# Patient Record
Sex: MALE | Race: WHITE | NOT HISPANIC OR LATINO | Employment: FULL TIME | URBAN - METROPOLITAN AREA
[De-identification: names, ages, dates, MRNs, and addresses within clinical notes are randomized per-mention and may not be internally consistent; named-entity substitution may affect disease eponyms.]

---

## 2023-08-21 ENCOUNTER — LAB (OUTPATIENT)
Dept: LAB | Facility: LAB | Age: 33
End: 2023-08-21
Payer: COMMERCIAL

## 2023-08-21 ENCOUNTER — OFFICE VISIT (OUTPATIENT)
Dept: PRIMARY CARE | Facility: CLINIC | Age: 33
End: 2023-08-21
Payer: COMMERCIAL

## 2023-08-21 VITALS
DIASTOLIC BLOOD PRESSURE: 60 MMHG | HEART RATE: 61 BPM | HEIGHT: 69 IN | WEIGHT: 193 LBS | BODY MASS INDEX: 28.58 KG/M2 | SYSTOLIC BLOOD PRESSURE: 102 MMHG

## 2023-08-21 DIAGNOSIS — Z00.00 HEALTHCARE MAINTENANCE: ICD-10-CM

## 2023-08-21 DIAGNOSIS — E78.2 MIXED HYPERLIPIDEMIA: ICD-10-CM

## 2023-08-21 DIAGNOSIS — M79.602 LEFT ARM PAIN: ICD-10-CM

## 2023-08-21 DIAGNOSIS — Z00.00 HEALTHCARE MAINTENANCE: Primary | ICD-10-CM

## 2023-08-21 LAB
ALANINE AMINOTRANSFERASE (SGPT) (U/L) IN SER/PLAS: 58 U/L (ref 10–52)
ALBUMIN (G/DL) IN SER/PLAS: 4.5 G/DL (ref 3.4–5)
ALKALINE PHOSPHATASE (U/L) IN SER/PLAS: 71 U/L (ref 33–120)
ANION GAP IN SER/PLAS: 12 MMOL/L (ref 10–20)
ASPARTATE AMINOTRANSFERASE (SGOT) (U/L) IN SER/PLAS: 32 U/L (ref 9–39)
BILIRUBIN TOTAL (MG/DL) IN SER/PLAS: 1 MG/DL (ref 0–1.2)
CALCIUM (MG/DL) IN SER/PLAS: 9.6 MG/DL (ref 8.6–10.3)
CARBON DIOXIDE, TOTAL (MMOL/L) IN SER/PLAS: 29 MMOL/L (ref 21–32)
CHLORIDE (MMOL/L) IN SER/PLAS: 105 MMOL/L (ref 98–107)
CHOLESTEROL (MG/DL) IN SER/PLAS: 173 MG/DL (ref 0–199)
CHOLESTEROL IN HDL (MG/DL) IN SER/PLAS: 61.3 MG/DL
CHOLESTEROL/HDL RATIO: 2.8
COBALAMIN (VITAMIN B12) (PG/ML) IN SER/PLAS: 469 PG/ML (ref 211–911)
CREATININE (MG/DL) IN SER/PLAS: 0.87 MG/DL (ref 0.5–1.3)
ERYTHROCYTE DISTRIBUTION WIDTH (RATIO) BY AUTOMATED COUNT: 13 % (ref 11.5–14.5)
ERYTHROCYTE MEAN CORPUSCULAR HEMOGLOBIN CONCENTRATION (G/DL) BY AUTOMATED: 33.1 G/DL (ref 32–36)
ERYTHROCYTE MEAN CORPUSCULAR VOLUME (FL) BY AUTOMATED COUNT: 92 FL (ref 80–100)
ERYTHROCYTES (10*6/UL) IN BLOOD BY AUTOMATED COUNT: 4.74 X10E12/L (ref 4.5–5.9)
GFR MALE: >90 ML/MIN/1.73M2
GLUCOSE (MG/DL) IN SER/PLAS: 88 MG/DL (ref 74–99)
HEMATOCRIT (%) IN BLOOD BY AUTOMATED COUNT: 43.5 % (ref 41–52)
HEMOGLOBIN (G/DL) IN BLOOD: 14.4 G/DL (ref 13.5–17.5)
LDL: 95 MG/DL (ref 0–99)
LEUKOCYTES (10*3/UL) IN BLOOD BY AUTOMATED COUNT: 8.4 X10E9/L (ref 4.4–11.3)
PLATELETS (10*3/UL) IN BLOOD AUTOMATED COUNT: 284 X10E9/L (ref 150–450)
POTASSIUM (MMOL/L) IN SER/PLAS: 4.5 MMOL/L (ref 3.5–5.3)
PROTEIN TOTAL: 6.4 G/DL (ref 6.4–8.2)
SODIUM (MMOL/L) IN SER/PLAS: 141 MMOL/L (ref 136–145)
THYROTROPIN (MIU/L) IN SER/PLAS BY DETECTION LIMIT <= 0.05 MIU/L: 1.6 MIU/L (ref 0.44–3.98)
TRIGLYCERIDE (MG/DL) IN SER/PLAS: 82 MG/DL (ref 0–149)
UREA NITROGEN (MG/DL) IN SER/PLAS: 14 MG/DL (ref 6–23)
VLDL: 16 MG/DL (ref 0–40)

## 2023-08-21 PROCEDURE — 36415 COLL VENOUS BLD VENIPUNCTURE: CPT

## 2023-08-21 PROCEDURE — 80053 COMPREHEN METABOLIC PANEL: CPT

## 2023-08-21 PROCEDURE — 82607 VITAMIN B-12: CPT

## 2023-08-21 PROCEDURE — 84443 ASSAY THYROID STIM HORMONE: CPT

## 2023-08-21 PROCEDURE — 99385 PREV VISIT NEW AGE 18-39: CPT | Performed by: CLINICAL NURSE SPECIALIST

## 2023-08-21 PROCEDURE — 80061 LIPID PANEL: CPT

## 2023-08-21 PROCEDURE — 85027 COMPLETE CBC AUTOMATED: CPT

## 2023-08-21 RX ORDER — IBUPROFEN 200 MG
200 TABLET ORAL EVERY 6 HOURS PRN
COMMUNITY

## 2023-08-21 ASSESSMENT — ENCOUNTER SYMPTOMS
HEADACHES: 0
SLEEP DISTURBANCE: 0
ARTHRALGIAS: 0
DEPRESSION: 0
JOINT SWELLING: 1
EYE PAIN: 0
NECK PAIN: 0
DIARRHEA: 0
SORE THROAT: 0
BACK PAIN: 0
FLANK PAIN: 0
ACTIVITY CHANGE: 0
DIZZINESS: 0
WHEEZING: 0
OCCASIONAL FEELINGS OF UNSTEADINESS: 0
DYSURIA: 0
SEIZURES: 0
CHILLS: 0
BLOOD IN STOOL: 0
CONFUSION: 0
PALPITATIONS: 0
POLYDIPSIA: 0
CHEST TIGHTNESS: 0
ABDOMINAL PAIN: 0
UNEXPECTED WEIGHT CHANGE: 0
SHORTNESS OF BREATH: 0
FATIGUE: 0
HEMATURIA: 0
TROUBLE SWALLOWING: 0
FEVER: 0
VOMITING: 0
BRUISES/BLEEDS EASILY: 0
APPETITE CHANGE: 0
LOSS OF SENSATION IN FEET: 0
COUGH: 0
MYALGIAS: 0
NAUSEA: 0
WOUND: 0
PHOTOPHOBIA: 0
CONSTIPATION: 0

## 2023-08-21 ASSESSMENT — COLUMBIA-SUICIDE SEVERITY RATING SCALE - C-SSRS
1. IN THE PAST MONTH, HAVE YOU WISHED YOU WERE DEAD OR WISHED YOU COULD GO TO SLEEP AND NOT WAKE UP?: NO
2. HAVE YOU ACTUALLY HAD ANY THOUGHTS OF KILLING YOURSELF?: NO
6. HAVE YOU EVER DONE ANYTHING, STARTED TO DO ANYTHING, OR PREPARED TO DO ANYTHING TO END YOUR LIFE?: NO

## 2023-08-21 ASSESSMENT — PATIENT HEALTH QUESTIONNAIRE - PHQ9
1. LITTLE INTEREST OR PLEASURE IN DOING THINGS: NOT AT ALL
2. FEELING DOWN, DEPRESSED OR HOPELESS: NOT AT ALL
SUM OF ALL RESPONSES TO PHQ9 QUESTIONS 1 AND 2: 0

## 2023-08-21 NOTE — PROGRESS NOTES
Subjective   Patient ID: Deepak Barger is a 32 y.o. male who presents for Establish Care (New patient wellness exam /Discuss possible xray for left arm ).  HPI    New patient here today to establish care.  Previously followed with Sybil at [facility]. Provider changed.    Was told that Cholesterol was elevated in the past. Started on Statin Therapy. Family history. Took for a few weeks and then stopped the medication. Has not taken anything since that time. Last blood work done August 2022.      Patient states that he had fallen on Frday. Fell off a Hover board. Landed on his left side. Increased pain, taking Ibuprofen/Tylenol. Patient states that he fell and landed possibly directly on it. Wearing a Sling. Has not been evaluated. Trouble with extension and rotation of the wrist as well with the Elbow. Trouble with holding arm up. Denies any numbness/tingling. Ibuprofen and Tylenol last taken last night. Left elbow pain on palpation with swelling.     Chewing tobacco.     Review of Systems   Constitutional:  Negative for activity change, appetite change, chills, fatigue, fever and unexpected weight change.   HENT:  Negative for ear pain, hearing loss, nosebleeds, sore throat, tinnitus and trouble swallowing.    Eyes:  Negative for photophobia, pain and visual disturbance.   Respiratory:  Negative for cough, chest tightness, shortness of breath and wheezing.    Cardiovascular:  Negative for chest pain, palpitations and leg swelling.   Gastrointestinal:  Negative for abdominal pain, blood in stool, constipation, diarrhea, nausea and vomiting.   Endocrine: Negative for cold intolerance, heat intolerance, polydipsia and polyuria.   Genitourinary:  Negative for dysuria, flank pain and hematuria.   Musculoskeletal:  Positive for joint swelling. Negative for arthralgias, back pain, myalgias and neck pain.   Skin:  Negative for pallor, rash and wound.   Allergic/Immunologic: Negative for immunocompromised state.    Neurological:  Negative for dizziness, seizures and headaches.   Hematological:  Does not bruise/bleed easily.   Psychiatric/Behavioral:  Negative for confusion and sleep disturbance.        Objective   Physical Exam  Vitals and nursing note reviewed.   Constitutional:       General: He is not in acute distress.     Appearance: Normal appearance.   HENT:      Head: Normocephalic.      Nose: Nose normal.   Eyes:      Conjunctiva/sclera: Conjunctivae normal.   Neck:      Vascular: No carotid bruit.   Cardiovascular:      Rate and Rhythm: Normal rate and regular rhythm.      Pulses: Normal pulses.      Heart sounds: Normal heart sounds.   Pulmonary:      Effort: Pulmonary effort is normal.      Breath sounds: Normal breath sounds.   Abdominal:      General: Bowel sounds are normal.      Palpations: Abdomen is soft.   Musculoskeletal:         General: Swelling and tenderness present. Normal range of motion.      Cervical back: Normal range of motion.   Skin:     General: Skin is warm and dry.   Neurological:      Mental Status: He is alert and oriented to person, place, and time. Mental status is at baseline.   Psychiatric:         Mood and Affect: Mood normal.         Behavior: Behavior normal.         Assessment/Plan       Wellness: Routine and age appropriate recommendations discussed with the patient today and patient verbalized understanding of the recommendations.  Questions answered.  Age appropriate immunizations and preventative screenings discussed with the patient and ordered as appropriate. Labs updated and ordered as indicated. Recommend healthy diet and daily exercise to maintain healthy body weight.   Arm Pain: Imaging ordered. Will follow up pending results. Conservative management until results are available.   Hyperlipidemia: Updated lab work ordered. Consider CT Cardiac Scoring due to family history depending on results.      Patient was identified as a fall risk. Risk prevention instructions  provided.    Wellness: August 2023.   Declined updated immunizations.   Tdap: September 2018.

## 2023-08-21 NOTE — PATIENT INSTRUCTIONS

## 2023-08-22 ENCOUNTER — TELEPHONE (OUTPATIENT)
Dept: PRIMARY CARE | Facility: CLINIC | Age: 33
End: 2023-08-22
Payer: COMMERCIAL

## 2023-08-22 DIAGNOSIS — R74.8 ELEVATED LIVER ENZYMES: ICD-10-CM

## 2023-08-22 DIAGNOSIS — S42.402A ELBOW FRACTURE, LEFT, CLOSED, INITIAL ENCOUNTER: ICD-10-CM

## 2023-08-22 NOTE — TELEPHONE ENCOUNTER
Please let patient know that there appears to be a fracture at the elbow. Would recommend Ortho referral, continue splinting until seen by Ortho. They are recommending conservative management based on Radiology recommendations, will likely need continued follow up imaging to ensure healing.     Blood work came back controlled. One of the Liver enzymes are elevated. Will need to continue to monitor. Repeat CMP in 6 weeks.

## 2023-09-26 DIAGNOSIS — S52.125A CLOSED NONDISPLACED FRACTURE OF HEAD OF LEFT RADIUS, INITIAL ENCOUNTER: ICD-10-CM

## 2023-10-03 ENCOUNTER — ANCILLARY PROCEDURE (OUTPATIENT)
Dept: RADIOLOGY | Facility: CLINIC | Age: 33
End: 2023-10-03
Payer: COMMERCIAL

## 2023-10-03 ENCOUNTER — OFFICE VISIT (OUTPATIENT)
Dept: ORTHOPEDIC SURGERY | Facility: CLINIC | Age: 33
End: 2023-10-03
Payer: COMMERCIAL

## 2023-10-03 VITALS — BODY MASS INDEX: 28.73 KG/M2 | WEIGHT: 194 LBS | HEIGHT: 69 IN

## 2023-10-03 DIAGNOSIS — S52.125D NONDISPLACED FRACTURE OF HEAD OF LEFT RADIUS, SUBSEQUENT ENCOUNTER FOR CLOSED FRACTURE WITH ROUTINE HEALING: Primary | ICD-10-CM

## 2023-10-03 DIAGNOSIS — S52.125A CLOSED NONDISPLACED FRACTURE OF HEAD OF LEFT RADIUS, INITIAL ENCOUNTER: ICD-10-CM

## 2023-10-03 DIAGNOSIS — S42.402A ELBOW FRACTURE, LEFT, CLOSED, INITIAL ENCOUNTER: ICD-10-CM

## 2023-10-03 PROCEDURE — 73080 X-RAY EXAM OF ELBOW: CPT | Mod: LEFT SIDE | Performed by: RADIOLOGY

## 2023-10-03 PROCEDURE — 99213 OFFICE O/P EST LOW 20 MIN: CPT | Performed by: EMERGENCY MEDICINE

## 2023-10-03 PROCEDURE — 73080 X-RAY EXAM OF ELBOW: CPT | Mod: LT,FY

## 2023-10-03 NOTE — LETTER
October 3, 2023     Veronika Ramirez, APRN-CNP  [address], [address]  [place] [id number]    Patient: Deepak Barger   YOB: 1990   Date of Visit: 10/3/2023       Dear Dr. Veronika Ramirez, APRN-CNP:    Thank you for referring Deepak Barger to me for evaluation. Below are my notes for this consultation.  If you have questions, please do not hesitate to call me. I look forward to following your patient along with you.       Sincerely,     John Delatorre MD      CC: No Recipients  ______________________________________________________________________________________    Subjective   Patient ID: Deepak Barger is a 32 y.o. male.    Chief Complaint: No chief complaint on file.     Last Surgery: No surgery found  Last Surgery Date: No surgery found    Deepak is a 32-year-old male coming in for evaluation of his left elbow.  This is a follow-up visit after he was seen by me on 8/29/2023.  Date of injury was 8/18/2023.  He sustained a small nondisplaced intra-articular radial head fracture.  At his last visit we agreed to manage this conservatively with physical therapy and range of motion exercises as well as anti-inflammatories and activity modifications.  Here today he states that overall he is better and that his pain has improved.  It is only 3 out of 10 but his range of motion is still not completely normal.  It has improved but he has not been able to go to physical therapy because of his work schedule.  He has been using bands and doing stretching exercises at home though.  He still would like to avoid an MRI and surgery if at all possible.        Objective  Right Elbow Exam   Right elbow exam is normal.      Left Elbow Exam   Left elbow exam is normal.    Tenderness   The patient is experiencing tenderness in the radial head.     Range of Motion   Left elbow extension: Cannot extend to 0 gets to about 10 degrees.   Left elbow flexion: Flexion to about  100 degrees.     Muscle Strength   The patient has normal left elbow strength.  Pronation:  5/5   Supination:  5/5     Tests   Varus: negative  Valgus: negative    Other   Erythema: absent  Sensation: normal  Pulse: present            Image Results:  XR elbow  Narrative: Interpreted By:  DOUG RICE MD  MRN: [id number]  Patient Name: ANA SOMMER     STUDY:  Left elbow,  3 views.     INDICATION:  pain  M25.522: Left elbow pain.     COMPARISON:  08/21/2023     ACCESSION NUMBER(S):  [id number]     ORDERING CLINICIAN:  LEÓN STAFFORD     FINDINGS:  Redemonstration of a mildly displaced radial head fracture with  associated moderate elbow joint effusion.  No degenerative changes. Unremarkable soft tissues.     Impression: 1. Mildly displaced radial head fracture with associated moderate  elbow joint effusion.     Repeat x-rays obtained on 10/3/2023 were reviewed and interpreted by me and showed routine healing of the radial head fracture.    Assessment/Plan  Encounter Diagnoses:  No diagnosis found.  Patient coming back in for a follow-up visit for his radial head fracture sustained on 8/18/2023.  His range of motion has not improved completely and I am concerned that he might have a mechanical block.  I suggested that we obtain an MRI but at this time he is extremely busy with work and surgery is not an option.  He would like to continue conservative treatment options without an MRI at this time and plans to do more stretching at home.  He still has his physical therapy order.  Otherwise he will follow-up with me in 1 month and if his range of motion has not completely improved we will likely get an MRI of the left elbow at that time and potentially refer him to surgery as he will likely be less busy without work this winter.    ** Please excuse any errors in grammar or translation related to this dictation. Voice recognition software was utilized to prepare this document. **    León Stafford MD    Sports Medicine   No orders of the defined types were placed in this encounter.    No follow-ups on file.

## 2023-10-03 NOTE — PROGRESS NOTES
Subjective    Patient ID: Deepak Sommer is a 32 y.o. male.    Chief Complaint: No chief complaint on file.     Last Surgery: No surgery found  Last Surgery Date: No surgery found    Deepak is a 32-year-old male coming in for evaluation of his left elbow.  This is a follow-up visit after he was seen by me on 8/29/2023.  Date of injury was 8/18/2023.  He sustained a small nondisplaced intra-articular radial head fracture.  At his last visit we agreed to manage this conservatively with physical therapy and range of motion exercises as well as anti-inflammatories and activity modifications.  Here today he states that overall he is better and that his pain has improved.  It is only 3 out of 10 but his range of motion is still not completely normal.  It has improved but he has not been able to go to physical therapy because of his work schedule.  He has been using bands and doing stretching exercises at home though.  He still would like to avoid an MRI and surgery if at all possible.        Objective   Right Elbow Exam   Right elbow exam is normal.      Left Elbow Exam   Left elbow exam is normal.    Tenderness   The patient is experiencing tenderness in the radial head.     Range of Motion   Left elbow extension: Cannot extend to 0 gets to about 10 degrees.   Left elbow flexion: Flexion to about 100 degrees.     Muscle Strength   The patient has normal left elbow strength.  Pronation:  5/5   Supination:  5/5     Tests   Varus: negative  Valgus: negative    Other   Erythema: absent  Sensation: normal  Pulse: present            Image Results:  XR elbow  Narrative: Interpreted By:  DOUG RICE MD  MRN: [id number]  Patient Name: DEEPAK SOMMER     STUDY:  Left elbow,  3 views.     INDICATION:  pain  M25.522: Left elbow pain.     COMPARISON:  08/21/2023     ACCESSION NUMBER(S):  [id number]     ORDERING CLINICIAN:  LEÓN STAFFORD     FINDINGS:  Redemonstration of a mildly displaced radial head fracture  with  associated moderate elbow joint effusion.  No degenerative changes. Unremarkable soft tissues.     Impression: 1. Mildly displaced radial head fracture with associated moderate  elbow joint effusion.     Repeat x-rays obtained on 10/3/2023 were reviewed and interpreted by me and showed routine healing of the radial head fracture.    Assessment/Plan   Encounter Diagnoses:  No diagnosis found.  Patient coming back in for a follow-up visit for his radial head fracture sustained on 8/18/2023.  His range of motion has not improved completely and I am concerned that he might have a mechanical block.  I suggested that we obtain an MRI but at this time he is extremely busy with work and surgery is not an option.  He would like to continue conservative treatment options without an MRI at this time and plans to do more stretching at home.  He still has his physical therapy order.  Otherwise he will follow-up with me in 1 month and if his range of motion has not completely improved we will likely get an MRI of the left elbow at that time and potentially refer him to surgery as he will likely be less busy without work this winter.    ** Please excuse any errors in grammar or translation related to this dictation. Voice recognition software was utilized to prepare this document. **    John Delatorre MD   Sports Medicine   No orders of the defined types were placed in this encounter.    No follow-ups on file.

## 2023-11-01 ENCOUNTER — APPOINTMENT (OUTPATIENT)
Dept: ORTHOPEDIC SURGERY | Facility: CLINIC | Age: 33
End: 2023-11-01
Payer: COMMERCIAL

## 2023-11-07 ENCOUNTER — OFFICE VISIT (OUTPATIENT)
Dept: ORTHOPEDIC SURGERY | Facility: CLINIC | Age: 33
End: 2023-11-07
Payer: COMMERCIAL

## 2023-11-07 VITALS — HEIGHT: 69 IN | BODY MASS INDEX: 28.73 KG/M2 | WEIGHT: 194 LBS

## 2023-11-07 DIAGNOSIS — S52.125D NONDISPLACED FRACTURE OF HEAD OF LEFT RADIUS, SUBSEQUENT ENCOUNTER FOR CLOSED FRACTURE WITH ROUTINE HEALING: ICD-10-CM

## 2023-11-07 NOTE — PROGRESS NOTES
Subjective    Patient ID: Deepak Barger is a 32 y.o. male.    Chief Complaint: Follow-up of the Left Elbow     Last Surgery: No surgery found  Last Surgery Date: No surgery found    Deepak is a 32-year-old male coming in for evaluation of his left elbow.  This is a follow-up visit after he was seen by me on 8/29/2023.  Date of injury was 8/18/2023.  He sustained a small nondisplaced intra-articular radial head fracture.  At his last visit we agreed to manage this conservatively with physical therapy and range of motion exercises as well as anti-inflammatories and activity modifications.  Here today he states that overall he is better and that his pain has improved.  It is only 3 out of 10 but his range of motion is still not completely normal.  It has improved but he has not been able to go to physical therapy because of his work schedule.  He has been using bands and doing stretching exercises at home though.  He still would like to avoid an MRI and surgery if at all possible.    Update on 11/7/2023: Patient is returning for his follow-up visit.  He has been consistently doing his home exercise program with range of motion exercises as well as anti-inflammatories and activity modifications.  Here today he again states that he is slightly better and that his pain has resolved.  Specifically, he has improved left elbow flexion and is almost able to touch his shoulder with his fingertips.  However, his extension is still severely limited.        Objective   Right Elbow Exam   Right elbow exam is normal.      Left Elbow Exam   Left elbow exam is normal.    Tenderness   The patient is experiencing tenderness in the radial head.     Range of Motion   Left elbow extension: Cannot extend to 0 gets to about 10 degrees.   Left elbow flexion: Flexion to about 120 degrees.     Muscle Strength   The patient has normal left elbow strength.  Pronation:  5/5   Supination:  5/5     Tests   Varus: negative  Valgus:  negative    Other   Erythema: absent  Sensation: normal  Pulse: present            Image Results:  No additional imaging here today    Assessment/Plan   Encounter Diagnoses:  Nondisplaced fracture of head of left radius, subsequent encounter for closed fracture with routine healing  Patient is coming back in for a follow-up visit for his radial head fracture sustained on 8/18/2023.  His range of motion has only slightly improved and I am concerned that he might have a mechanical block.  I offered him an MRI at his last visit but he wanted to work on range of motion exercises as he would like to avoid surgery if at all possible.  However at his return visit here today he still does not have normal range of motion at the left elbow.  His pain has resolved.  He is now agreed to obtain an MRI and I will follow-up with him afterwards.  We can refer to orthopedic surgery if needed depending on the results.    ** Please excuse any errors in grammar or translation related to this dictation. Voice recognition software was utilized to prepare this document. **    John Delatorre MD   Sports Medicine   Orders Placed This Encounter    MR elbow left wo IV contrast       No follow-ups on file.

## 2023-11-24 ENCOUNTER — HOSPITAL ENCOUNTER (OUTPATIENT)
Dept: RADIOLOGY | Facility: HOSPITAL | Age: 33
Discharge: HOME | End: 2023-11-24
Payer: COMMERCIAL

## 2023-11-24 DIAGNOSIS — S52.125D NONDISPLACED FRACTURE OF HEAD OF LEFT RADIUS, SUBSEQUENT ENCOUNTER FOR CLOSED FRACTURE WITH ROUTINE HEALING: ICD-10-CM

## 2023-11-24 PROCEDURE — 73221 MRI JOINT UPR EXTREM W/O DYE: CPT | Mod: LT

## 2023-11-24 PROCEDURE — 73221 MRI JOINT UPR EXTREM W/O DYE: CPT | Mod: LEFT SIDE | Performed by: RADIOLOGY

## 2023-12-12 ENCOUNTER — PATIENT MESSAGE (OUTPATIENT)
Dept: ORTHOPEDIC SURGERY | Facility: CLINIC | Age: 33
End: 2023-12-12
Payer: COMMERCIAL

## 2023-12-13 NOTE — TELEPHONE ENCOUNTER
From: Deepak Barger  To: John Delatorre MD  Sent: 12/12/2023 2:07 PM EST  Subject: MRI results    Good afternoon    The MRI has resulted and I haven’t heard any further instruction on what to expect for my elbow. I would like to know what to expect whether it be surgery or there’s no further treatment needed. Thank you.    Deepak Barger

## 2023-12-15 ENCOUNTER — OFFICE VISIT (OUTPATIENT)
Dept: ORTHOPEDIC SURGERY | Facility: CLINIC | Age: 33
End: 2023-12-15
Payer: COMMERCIAL

## 2023-12-15 VITALS — WEIGHT: 190 LBS | BODY MASS INDEX: 28.14 KG/M2 | HEIGHT: 69 IN

## 2023-12-15 DIAGNOSIS — S52.125D NONDISPLACED FRACTURE OF HEAD OF LEFT RADIUS, SUBSEQUENT ENCOUNTER FOR CLOSED FRACTURE WITH ROUTINE HEALING: Primary | ICD-10-CM

## 2023-12-15 PROCEDURE — 99213 OFFICE O/P EST LOW 20 MIN: CPT | Performed by: EMERGENCY MEDICINE

## 2023-12-15 NOTE — PROGRESS NOTES
Subjective    Patient ID: Deepak Barger is a 33 y.o. male.    Chief Complaint: Follow-up of the Left Elbow (MrI results)     Last Surgery: No surgery found  Last Surgery Date: No surgery found    Deepak is a 32-year-old male coming in for evaluation of his left elbow.  This is a follow-up visit after he was seen by me on 8/29/2023.  Date of injury was 8/18/2023.  He sustained a small nondisplaced intra-articular radial head fracture.  At his last visit we agreed to manage this conservatively with physical therapy and range of motion exercises as well as anti-inflammatories and activity modifications.  Here today he states that overall he is better and that his pain has improved.  It is only 3 out of 10 but his range of motion is still not completely normal.  It has improved but he has not been able to go to physical therapy because of his work schedule.  He has been using bands and doing stretching exercises at home though.  He still would like to avoid an MRI and surgery if at all possible.    Update on 11/7/2023: Patient is returning for his follow-up visit.  He has been consistently doing his home exercise program with range of motion exercises as well as anti-inflammatories and activity modifications.  Here today he again states that he is slightly better and that his pain has resolved.  Specifically, he has improved left elbow flexion and is almost able to touch his shoulder with his fingertips.  However, his extension is still severely limited. We agreed to obtain an MRI.     Update on 12/15/2023.  Patient is returning to review his MRI results and to discuss other potential treatment options.  His MRI did not show anything unexpected.  He has an effusion with a healing radial head fracture.  However, his extension is still very limited on exam.  He has not yet been able to do any occupational therapy.  At this point, he is interested in surgery if it would help regain some of his range of  motion.        Objective   Right Elbow Exam   Right elbow exam is normal.      Left Elbow Exam   Left elbow exam is normal.    Tenderness   The patient is experiencing no tenderness.     Range of Motion   Left elbow extension: Cannot extend to 0 gets to about 25-30 degrees.   Left elbow flexion: Flexion nearly normal, Able to touch L shoulder with L fingertips.     Muscle Strength   The patient has normal left elbow strength.  Pronation:  5/5   Supination:  5/5     Tests   Varus: negative  Valgus: negative    Other   Erythema: absent  Sensation: normal  Pulse: present            Image Results:  MRI of the left elbow was reviewed and interpreted by me and revealed the healing radial head fracture with a small amount of hemarthrosis.  No evidence of mechanical block.    Assessment/Plan   Encounter Diagnoses:  Nondisplaced fracture of head of left radius, subsequent encounter for closed fracture with routine healing    No orders of the defined types were placed in this encounter.    No follow-ups on file.    We discussed his treatment options and at this time agreed to refer him to Dr. Davis to see if there is anything surgical that could be done to help his range of motion.  Happy to see him back as needed if they decide to continue nonoperative management.    ** Please excuse any errors in grammar or translation related to this dictation. Voice recognition software was utilized to prepare this document. **       John Delatorre MD  [facility] Sports Medicine

## 2024-01-03 ENCOUNTER — OFFICE VISIT (OUTPATIENT)
Dept: ORTHOPEDIC SURGERY | Facility: CLINIC | Age: 34
End: 2024-01-03
Payer: COMMERCIAL

## 2024-01-03 VITALS — WEIGHT: 190 LBS | HEIGHT: 69 IN | BODY MASS INDEX: 28.14 KG/M2

## 2024-01-03 DIAGNOSIS — S52.125D NONDISPLACED FRACTURE OF HEAD OF LEFT RADIUS, SUBSEQUENT ENCOUNTER FOR CLOSED FRACTURE WITH ROUTINE HEALING: ICD-10-CM

## 2024-01-03 PROCEDURE — 99204 OFFICE O/P NEW MOD 45 MIN: CPT | Performed by: ORTHOPAEDIC SURGERY

## 2024-01-03 ASSESSMENT — PAIN - FUNCTIONAL ASSESSMENT: PAIN_FUNCTIONAL_ASSESSMENT: NO/DENIES PAIN

## 2024-01-03 NOTE — PROGRESS NOTES
NPV/Referred by Dr Delatorre for left elbow that he is not able to fully extend.  He had a prior fracture in August 2023.  He denies pain unless he tries to force it straight.  He did have xrays and an MRI done.

## 2024-01-03 NOTE — PROGRESS NOTES
33 y.o. male presents today for evaluation of left elbow stiffness. The patient reports he injured his elbow 8/29/23 after fall onto that arm.  He was treated conservatively by Dr. Delatorre. Pain is controlled. Patient reports no numbness and tingling.  Reports no previous surgeries, injections, or trauma to the area.  Reports no pain.  States ROM improving but still nto able to fullyextend and flex like the contralateral elbow.    Review of Systems    Constitutional: see HPI, no fever, no chills, not feeling tired, no significant weight gain or weight loss.   Eyes: No vision changes  ENT: no nosebleeds.   Cardiovascular: no chest pain.   Respiratory: no shortness of breath and no cough.   Gastrointestinal: no abdominal pain, no nausea, no vomiting and no diarrhea.   Musculoskeletal: per HPI  Neurological: no headache, no gait disturbances  Psychiatric: no depression and no sleep disturbances.   Endocrine: no muscle weakness and no muscle cramps.   Hematologic/Lymphatic: no swollen glands and no tendency for easy bruising or excessive swelling.     Patient's past medical history, past surgical history, allergies, and medications have been reviewed unless otherwise noted in the chart.     Elbow Exam  Inspection:  no evidence of infection, no erythema, no edema, Palpation:  compartments are soft, Range of Motion:   Stability:  no elbow instability noted, Strength:  5/5 elbow flexion/extension, Skin:  intact, Vascular:  capillary refill <2 seconds distally, Sensation:  intact to light touch distally.  NTTP radial head    Constitutional   General appearance: Alert and in no acute distress. Well developed, well nourished.    Eyes   External Eye, Conjunctiva and lids: Normal external exam - pupils were equal in size, round, reactive to light (PERRL) with normal accommodation and extraocular movements intact (EOMI).   Ears, Nose, Mouth, and Throat   Hearing: Normal.   Neck   Neck: No neck mass was observed. Supple.    Pulmonary   Respiratory effort: No respiratory distress.   Cardiovascular   Examination of extremities: No peripheral edema.   Neurologic   Sensation: Normal.   Psychiatric   Judgment and insight: Intact.   Orientation to person, place, and time: Alert and oriented x 3.       Mood and affect: Normal.      XR/MRI - healing radial head fracture ND    Left radial head fracture 4 months after injury  I discussed the diagnosis and treatment options with the patient today along with their associated risks and benefits. After thorough discussion, the patient has elected to proceed with conservative management. All questions were answered to the patients satisfaction who seems satisfied with the plan.      OT - E/T  FU 8 weeks prn - no XR

## 2024-08-21 ENCOUNTER — APPOINTMENT (OUTPATIENT)
Dept: PRIMARY CARE | Facility: CLINIC | Age: 34
End: 2024-08-21
Payer: COMMERCIAL

## 2024-08-21 VITALS
DIASTOLIC BLOOD PRESSURE: 72 MMHG | WEIGHT: 185 LBS | HEIGHT: 69 IN | HEART RATE: 92 BPM | BODY MASS INDEX: 27.4 KG/M2 | SYSTOLIC BLOOD PRESSURE: 114 MMHG

## 2024-08-21 DIAGNOSIS — Z00.00 HEALTHCARE MAINTENANCE: ICD-10-CM

## 2024-08-21 PROCEDURE — 99395 PREV VISIT EST AGE 18-39: CPT | Performed by: CLINICAL NURSE SPECIALIST

## 2024-08-21 PROCEDURE — 3008F BODY MASS INDEX DOCD: CPT | Performed by: CLINICAL NURSE SPECIALIST

## 2024-08-21 ASSESSMENT — ENCOUNTER SYMPTOMS
BRUISES/BLEEDS EASILY: 0
BACK PAIN: 0
BLOOD IN STOOL: 0
LOSS OF SENSATION IN FEET: 0
OCCASIONAL FEELINGS OF UNSTEADINESS: 0
PHOTOPHOBIA: 0
EYE PAIN: 0
NAUSEA: 0
CONFUSION: 0
PALPITATIONS: 0
DIARRHEA: 0
APPETITE CHANGE: 0
HEMATURIA: 0
JOINT SWELLING: 0
ABDOMINAL PAIN: 0
SHORTNESS OF BREATH: 0
SEIZURES: 0
FATIGUE: 0
FLANK PAIN: 0
WOUND: 0
CONSTIPATION: 0
CHILLS: 0
NECK PAIN: 0
ACTIVITY CHANGE: 0
DEPRESSION: 0
SLEEP DISTURBANCE: 0
SORE THROAT: 0
FEVER: 0
UNEXPECTED WEIGHT CHANGE: 0
HEADACHES: 0
TROUBLE SWALLOWING: 0
VOMITING: 0
POLYDIPSIA: 0
MYALGIAS: 0
CHEST TIGHTNESS: 0
DIZZINESS: 0
COUGH: 0
ARTHRALGIAS: 0
WHEEZING: 0
DYSURIA: 0

## 2024-08-21 ASSESSMENT — COLUMBIA-SUICIDE SEVERITY RATING SCALE - C-SSRS
2. HAVE YOU ACTUALLY HAD ANY THOUGHTS OF KILLING YOURSELF?: NO
6. HAVE YOU EVER DONE ANYTHING, STARTED TO DO ANYTHING, OR PREPARED TO DO ANYTHING TO END YOUR LIFE?: NO
1. IN THE PAST MONTH, HAVE YOU WISHED YOU WERE DEAD OR WISHED YOU COULD GO TO SLEEP AND NOT WAKE UP?: NO

## 2024-08-21 ASSESSMENT — PATIENT HEALTH QUESTIONNAIRE - PHQ9
1. LITTLE INTEREST OR PLEASURE IN DOING THINGS: NOT AT ALL
SUM OF ALL RESPONSES TO PHQ9 QUESTIONS 1 AND 2: 0
2. FEELING DOWN, DEPRESSED OR HOPELESS: NOT AT ALL

## 2024-08-21 NOTE — PROGRESS NOTES
Subjective   Patient ID: Deepak Barger is a 33 y.o. male who presents for Annual Exam (Wellness exam ).  HPI    Here today as a follow up appointment. Due for Wellness exam.      Was told that Cholesterol was elevated in the past. Started on Statin Therapy. Family history. Took for a few weeks and then stopped the medication. Has not taken anything since that time. Lab work completed August 2023, improved.      Chewing tobacco.     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 Denies any recent Dental exams.     Review of Systems   Constitutional:  Negative for activity change, appetite change, chills, fatigue, fever and unexpected weight change.   HENT:  Negative for ear pain, hearing loss, nosebleeds, sore throat, tinnitus and trouble swallowing.    Eyes:  Negative for photophobia, pain and visual disturbance.   Respiratory:  Negative for cough, chest tightness, shortness of breath and wheezing.    Cardiovascular:  Negative for chest pain, palpitations and leg swelling.   Gastrointestinal:  Negative for abdominal pain, blood in stool, constipation, diarrhea, nausea and vomiting.   Endocrine: Negative for cold intolerance, heat intolerance, polydipsia and polyuria.   Genitourinary:  Negative for dysuria, flank pain and hematuria.   Musculoskeletal:  Negative for arthralgias, back pain, joint swelling, myalgias and neck pain.   Skin:  Negative for pallor, rash and wound.   Allergic/Immunologic: Negative for immunocompromised state.   Neurological:  Negative for dizziness, seizures and headaches.   Hematological:  Does not bruise/bleed easily.   Psychiatric/Behavioral:  Negative for confusion and sleep disturbance.        Objective   Physical Exam  Vitals and nursing note reviewed.   Constitutional:       General: He is not in acute distress.     Appearance: Normal appearance.   HENT:      Head: Normocephalic.      Nose: Nose normal.   Eyes:      Conjunctiva/sclera: Conjunctivae normal.   Neck:       Vascular: No carotid bruit.   Cardiovascular:      Rate and Rhythm: Normal rate and regular rhythm.      Pulses: Normal pulses.      Heart sounds: Normal heart sounds.   Pulmonary:      Effort: Pulmonary effort is normal.      Breath sounds: Normal breath sounds.   Abdominal:      General: Bowel sounds are normal.      Palpations: Abdomen is soft.   Musculoskeletal:         General: Normal range of motion.      Cervical back: Normal range of motion.   Skin:     General: Skin is warm and dry.   Neurological:      Mental Status: He is alert and oriented to person, place, and time. Mental status is at baseline.   Psychiatric:         Mood and Affect: Mood normal.         Behavior: Behavior normal.       Assessment/Plan       New order for blood work provided at [facility] today.      Wellness: Routine and age appropriate recommendations discussed with the patient today and patient verbalized understanding of the recommendations.  Questions answered.  Age appropriate immunizations and preventative screenings discussed with the patient and ordered as appropriate. Labs updated and ordered as indicated. Recommend healthy diet and daily exercise to maintain healthy body weight.   Hyperlipidemia: Updated lab work ordered. Consider CT Cardiac Scoring due to family history depending on results, otherwise considering around the age of 37.      Wellness: August 2024.   Declined updated immunizations.   Tdap: September 2018.     Veronika Ramirez, INÉS-CNS 08/21/24 7:26 AM

## 2025-08-22 ENCOUNTER — APPOINTMENT (OUTPATIENT)
Dept: PRIMARY CARE | Facility: CLINIC | Age: 35
End: 2025-08-22
Payer: COMMERCIAL

## 2026-02-10 ENCOUNTER — APPOINTMENT (OUTPATIENT)
Dept: PRIMARY CARE | Facility: CLINIC | Age: 36
End: 2026-02-10
Payer: COMMERCIAL